# Patient Record
Sex: MALE | HISPANIC OR LATINO | Employment: FULL TIME | URBAN - METROPOLITAN AREA
[De-identification: names, ages, dates, MRNs, and addresses within clinical notes are randomized per-mention and may not be internally consistent; named-entity substitution may affect disease eponyms.]

---

## 2018-04-27 ENCOUNTER — HOSPITAL ENCOUNTER (EMERGENCY)
Facility: CLINIC | Age: 43
Discharge: HOME | End: 2018-04-27
Attending: EMERGENCY MEDICINE | Admitting: EMERGENCY MEDICINE

## 2018-04-27 ENCOUNTER — APPOINTMENT (OUTPATIENT)
Dept: CT IMAGING | Facility: CLINIC | Age: 43
End: 2018-04-27
Attending: EMERGENCY MEDICINE

## 2018-04-27 VITALS
DIASTOLIC BLOOD PRESSURE: 109 MMHG | OXYGEN SATURATION: 98 % | RESPIRATION RATE: 18 BRPM | HEART RATE: 70 BPM | TEMPERATURE: 98.1 F | SYSTOLIC BLOOD PRESSURE: 138 MMHG

## 2018-04-27 DIAGNOSIS — M54.50 ACUTE RIGHT-SIDED LOW BACK PAIN WITHOUT SCIATICA: ICD-10-CM

## 2018-04-27 LAB
ALBUMIN SERPL-MCNC: 4 G/DL (ref 3.4–5)
ALBUMIN UR-MCNC: 10 MG/DL
ALP SERPL-CCNC: 75 U/L (ref 40–150)
ALT SERPL W P-5'-P-CCNC: 40 U/L (ref 0–70)
ANION GAP SERPL CALCULATED.3IONS-SCNC: 9 MMOL/L (ref 3–14)
APPEARANCE UR: CLEAR
AST SERPL W P-5'-P-CCNC: 16 U/L (ref 0–45)
BASOPHILS # BLD AUTO: 0 10E9/L (ref 0–0.2)
BASOPHILS NFR BLD AUTO: 0.2 %
BILIRUB SERPL-MCNC: 0.4 MG/DL (ref 0.2–1.3)
BILIRUB UR QL STRIP: NEGATIVE
BUN SERPL-MCNC: 19 MG/DL (ref 7–30)
CALCIUM SERPL-MCNC: 8.9 MG/DL (ref 8.5–10.1)
CHLORIDE SERPL-SCNC: 104 MMOL/L (ref 94–109)
CO2 SERPL-SCNC: 26 MMOL/L (ref 20–32)
COLOR UR AUTO: YELLOW
CREAT SERPL-MCNC: 0.77 MG/DL (ref 0.66–1.25)
DIFFERENTIAL METHOD BLD: ABNORMAL
EOSINOPHIL # BLD AUTO: 0 10E9/L (ref 0–0.7)
EOSINOPHIL NFR BLD AUTO: 0.2 %
ERYTHROCYTE [DISTWIDTH] IN BLOOD BY AUTOMATED COUNT: 13 % (ref 10–15)
GFR SERPL CREATININE-BSD FRML MDRD: >90 ML/MIN/1.7M2
GLUCOSE SERPL-MCNC: 113 MG/DL (ref 70–99)
GLUCOSE UR STRIP-MCNC: NEGATIVE MG/DL
HCT VFR BLD AUTO: 48.4 % (ref 40–53)
HGB BLD-MCNC: 16.5 G/DL (ref 13.3–17.7)
HGB UR QL STRIP: NEGATIVE
IMM GRANULOCYTES # BLD: 0.4 10E9/L (ref 0–0.4)
IMM GRANULOCYTES NFR BLD: 3.3 %
KETONES UR STRIP-MCNC: NEGATIVE MG/DL
LEUKOCYTE ESTERASE UR QL STRIP: NEGATIVE
LIPASE SERPL-CCNC: 309 U/L (ref 73–393)
LYMPHOCYTES # BLD AUTO: 1.9 10E9/L (ref 0.8–5.3)
LYMPHOCYTES NFR BLD AUTO: 14.3 %
MCH RBC QN AUTO: 30.4 PG (ref 26.5–33)
MCHC RBC AUTO-ENTMCNC: 34.1 G/DL (ref 31.5–36.5)
MCV RBC AUTO: 89 FL (ref 78–100)
MONOCYTES # BLD AUTO: 1 10E9/L (ref 0–1.3)
MONOCYTES NFR BLD AUTO: 7.7 %
MUCOUS THREADS #/AREA URNS LPF: PRESENT /LPF
NEUTROPHILS # BLD AUTO: 9.9 10E9/L (ref 1.6–8.3)
NEUTROPHILS NFR BLD AUTO: 74.3 %
NITRATE UR QL: NEGATIVE
NRBC # BLD AUTO: 0 10*3/UL
NRBC BLD AUTO-RTO: 0 /100
PH UR STRIP: 6.5 PH (ref 5–7)
PLATELET # BLD AUTO: 309 10E9/L (ref 150–450)
POTASSIUM SERPL-SCNC: 4.1 MMOL/L (ref 3.4–5.3)
PROT SERPL-MCNC: 7.7 G/DL (ref 6.8–8.8)
RBC # BLD AUTO: 5.42 10E12/L (ref 4.4–5.9)
RBC #/AREA URNS AUTO: 1 /HPF (ref 0–2)
SODIUM SERPL-SCNC: 139 MMOL/L (ref 133–144)
SOURCE: ABNORMAL
SP GR UR STRIP: 1.03 (ref 1–1.03)
SQUAMOUS #/AREA URNS AUTO: <1 /HPF (ref 0–1)
UROBILINOGEN UR STRIP-MCNC: NORMAL MG/DL (ref 0–2)
WBC # BLD AUTO: 13.3 10E9/L (ref 4–11)
WBC #/AREA URNS AUTO: 1 /HPF (ref 0–5)

## 2018-04-27 PROCEDURE — 74176 CT ABD & PELVIS W/O CONTRAST: CPT

## 2018-04-27 PROCEDURE — 85025 COMPLETE CBC W/AUTO DIFF WBC: CPT | Performed by: EMERGENCY MEDICINE

## 2018-04-27 PROCEDURE — 99285 EMERGENCY DEPT VISIT HI MDM: CPT | Mod: Z6 | Performed by: EMERGENCY MEDICINE

## 2018-04-27 PROCEDURE — 80053 COMPREHEN METABOLIC PANEL: CPT | Performed by: EMERGENCY MEDICINE

## 2018-04-27 PROCEDURE — 96374 THER/PROPH/DIAG INJ IV PUSH: CPT

## 2018-04-27 PROCEDURE — 25000128 H RX IP 250 OP 636: Performed by: EMERGENCY MEDICINE

## 2018-04-27 PROCEDURE — 81001 URINALYSIS AUTO W/SCOPE: CPT | Performed by: EMERGENCY MEDICINE

## 2018-04-27 PROCEDURE — 99285 EMERGENCY DEPT VISIT HI MDM: CPT | Mod: 25

## 2018-04-27 PROCEDURE — 83690 ASSAY OF LIPASE: CPT | Performed by: EMERGENCY MEDICINE

## 2018-04-27 RX ORDER — OXYCODONE AND ACETAMINOPHEN 5; 325 MG/1; MG/1
1-2 TABLET ORAL EVERY 4 HOURS PRN
Qty: 14 TABLET | Refills: 0 | Status: SHIPPED | OUTPATIENT
Start: 2018-04-27

## 2018-04-27 RX ORDER — MORPHINE SULFATE 4 MG/ML
4 INJECTION, SOLUTION INTRAMUSCULAR; INTRAVENOUS ONCE
Status: COMPLETED | OUTPATIENT
Start: 2018-04-27 | End: 2018-04-27

## 2018-04-27 RX ADMIN — MORPHINE SULFATE 4 MG: 4 INJECTION, SOLUTION INTRAMUSCULAR; INTRAVENOUS at 09:04

## 2018-04-27 ASSESSMENT — ENCOUNTER SYMPTOMS
FEVER: 0
WEAKNESS: 0
NUMBNESS: 0
ABDOMINAL PAIN: 0
NAUSEA: 0
DYSURIA: 0
VOMITING: 0
BACK PAIN: 1
SHORTNESS OF BREATH: 0

## 2018-04-27 NOTE — ED AVS SNAPSHOT
[facility], Emergency Department    [address] [address]    Phone:  [phone]    Fax:  [phone]                                       Greg Albert   MRN: [id number]    Department:  [facility], Emergency Department   Date of Visit:  4/27/2018           Patient Information     Date Of Birth          1975        Your diagnoses for this visit were:     Acute right-sided low back pain without sciatica        You were seen by Virginia Avalos MD.        Discharge Instructions         Dolor De Sridevi O De Espalda [Neck/Back Pain, General]    Tanto el dolor de sridevi fazal el de espalda suelen ser consecuencia de santana lesión en los músculos o ligamentos de la columna vertebral. Algunas veces los discos que separan cada hueso de la columna pueden producir dolor al ejercer presión sobre el nervio que está al lado. El dolor de sridevi y de espalda puede aparecer a consecuencia de santana fuerza que produce torsión repentina (fazal en un accidente de automóvil) o de un movimiento inhabitual. En ambos casos, es frecuente que el problema vaya acompañado por espasmos musculares que contribuyen al dolor.  El dolor pierre de sridevi y de espalda suele mejorar en santana o dos semanas. El dolor relacionado con los trastornos de los discos, la artritis en las articulaciones vertebrales o la estenosis (estrechamiento) del canal medular pueden convertirse en problemas crónicos y durar meses o años.  Cuidados En [place]:  1. PARA EL DOLOR DE SRIDEVI: Use santana almohada cómoda que dé soporte a la kristopher y mantenga la columna en santana posición neutral. La kristopher no debe estar inclinada hacia adelante ni hacia atrás.  PARA EL DOLOR DE ESPALDA: Es posible que deba permanecer en la cama los primeros días. Holger tan pronto pueda, debe empezar a sentarse o caminar para evitar los problemas asociados con el descanso prolongado en la cama (debilidad muscular, empeoramiento de la rigidez y el dolor en la espalda, y  coágulos sanguíneos en las piernas).  2. Cuando esté en la cama, trate de encontrar santana posición cómoda. Es aconsejable que use un colchón firme. Intente acostarse boca arriba con almohadas debajo de las rodillas. También puede intentar acostarse de lado con las rodillas dobladas hacia el pecho y santana almohada entre las rodillas.  3. Evite permanecer sentado kimberly períodos largos, ya que esto implica más esfuerzo en la parte inferior de la espalda que cuando está de pié o caminando.  4. Algunas personas encuentran alivio con la aplicación de calor (ducha o baño caliente, o santana compresa caliente) y masajes, mientras que otras prefieren aplicar frío (hielo sadaf o en cubos dentro de santana bolsa envuelta en santana toalla). Pruebe ambos métodos y use el que le dé mejor resultado kimberly 20 minutos varias veces al día.  5. Puede usar acetaminofén (Tylenol) o ibuprofeno (Motrin o Advil) para controlar el dolor, a menos que le hayan recetado otro medicamento. [NOTA: Si tiene santana enfermedad hepática o renal crónica, o ha tenido alguna vez santana úlcera estomacal o sangrado gastrointestinal, consulte con brar médico antes de sarah estos medicamentos.]  6. Aprenda las técnicas adecuadas para levantar objetos y no levante nada que pese más de 15 libras hasta que el dolor haya desaparecido por completo.  Programe santana VISITA DE CONTROL con brar médico o con eden centro si los síntomas no empiezan a mejorar después de santana semana. Es posible que necesite fisioterapia o pruebas adicionales.  [NOTA: Si le hicieron radiografías, éstas serán examinadas por un radiólogo y le informarán de los nuevos hallazgos que puedan afectar la atención médica que necesita.]  Busque Prontamente Atención Médica  si algo de lo siguiente ocurre:    El dolor empeora o se extiende a los brazos o a las piernas    Debilidad, entumecimiento o dolor en catracho o ambos brazos o piernas    Pérdida del control del intestino o de la vejiga    Entumecimiento en la marcial de las  ingles    Dificultad para caminar    Fiebre de 100.4 F (38 C) o más schuyler, o fazal le haya indicado brar proveedor de atención médica  Date Last Reviewed: 11/12/2013 2000-2017 [email or web address]. [address] [place] [id number]. Todos los derechos reservados. Esta información no pretende sustituir la atención médica profesional. Sólo brar médico puede diagnosticar y tratar un problema de isaías.          Consejos Para El Cuidado De La Espalda [Back Care Tips]    Hay ciertas cosas que usted puede hacer para prevenir la recurrencia del dolor de espalda pierre y reducir los síntomas del dolor de espalda crónico:    Mantenga un peso saludable. Si tiene sobrepeso, perder peso le ayudará a aliviar la mayoría de los poonam de espalda.    El ejercicio es santana parte importante de la recuperación de los poonam de espalda. La espalda está soportada por los músculos que se encuentran detrás y spike de la columna vertebral. [place] significa que los músculos de la espalda y del abdomen deben fortalecerse para ondina mejor soporte a la columna vertebral.     Nadar y caminar a paso rápido son buenas formas de ejercicio para mejorar brar nivel de forma física.    Practique técnicas seguras para levantar objetos (nai la información que se presenta más abajo).    Adopte posturas correctas al estar sentado, parado o caminando. Evite permanecer sentado kimberly períodos largos, ya que esto implica más esfuerzo en la parte inferior de la espalda que cuando está de pie o caminando.    Póngase zapatos de buena calidad con suficiente soporte del arco. La alineación de los pies y de los tobillos puede afectar los síntomas de la espalda. Las mujeres deben evitar los zapatos de tacón alto.    Los masajes terapéuticos pueden ayudar a aliviar el dolor de espalda crónico.    Kimberly los primeros dos días después de santana lesión severa o un ataque de dolor crónico de espalda, aplique santana bolsa de hielo sobre la marcial dolorida kimberly 20  minutos cada 2-4 horas. Dennard reducirá la hinchazón y el dolor. El calor (duchas o lisa en [place] o santana almohadilla calefactora) ayudan a reducir los espasmos musculares. Puede comenzar aplicando hielo y luego aplicar calor al cabo de dos días. Algunos pacientes se sienten mejor alternando los tratamientos con hielo y con calor. Utilice el método que le dé mejor resultado.    Puede usar acetaminofén (Tylenol) o ibuprofeno (Motrin o Advil) para controlar el dolor, a menos que le hayan recetado otro medicamento.[NOTA: Si tiene santana enfermedad hepática o renal crónica, o ha tenido alguna vez santana úlcera estomacal o sangrado gastrointestinal, consulte con brar médico antes de sarah estos medicamentos.]     Estiramiento Lumbar  Mily es un ejercicio simple de estiramiento que le ayudará a relajar los espasmos musculares y a mantener la espalda más flexible. Si el ejercicio le empeora el dolor de espalda, deje de hacerlo.    Acuéstese boca arriba con las rodillas flexionadas y ambos pies apoyados en el piso.    Levante lentamente la rodilla izquierda hacia el pecho, con la espalda plana contra el piso. Sostenga la posición kimberly 5 segundos.    Relájese y repita el ejercicio con la rodilla derecha.    Repita el ejercicio 10 veces con cada pierna.  Técnica Para Levantar Objetos De Forma Abdi    No doble la cintura para levantar objetos del suelo. En vez de hacer eso, agáchese doblando las rodillas y las caderas.     Mantenga la espalda recta y la kristopher erguida.     Agarre el objeto cerca del cuerpo, directamente frente a usted.    Enderece las piernas para levantar el objeto.     Para bajar el objeto, siga la misma técnica en orden inverso.    Si tiene que arrastrar un objeto por el suelo, empújelo.  Consejos Para Mantener Santana Victoria Postura  SENTADO  Siéntese en jesusita con respaldos rectos o con soporte para la parte inferior de la espalda. Mantenga las rodillas un poco más elevadas que las caderas. En lo  necesario, use un banquito o taburete bajo para mantener los pies apoyados sobre santana superficie sólida.  Siéntese con la espalda recta mientras maneja. Ajuste el asiento hacia adelante para no tener que inclinarse hacia el volante. Santana almohada pequeña o santana toalla enrollada detrás de la parte inferior de la espalda podría ser útil para conducir en viajes largos.   DE PIE  Cuando tenga que estar parado (de pie) kimberly largos períodos apoye la mayor parte del peso sobre santana pierna, cambiando de pierna cada pocos minutos.   AL DORMIR  La mejor manera de dormir es de lado, con las rodillas dobladas. Apoye la kristopher en santana almohada baja para ondina soporte al jeovanny de forma que la columna vertebral quede en posición neutral. Evite las almohadas demasiado gruesas que doblan el jeovanny hacia un lado. Ponga santana almohada entre las piernas para relajar más la parte inferior de la espalda. Si duerme boca arriba, ponga almohadas debajo de las rodillas para mantener las piernas ligeramente flexionadas. Use un colchón firme. Si brar colchón se hunde en la parte donde duerme, cámbielo o use santana tabla de millan contrachapada de media pulgada de espesor debajo del colchón para darle más soporte.  Programe santana VISITA DE CONTROL con brar médico, o según le indique nuestro personal médico.  [NOTA: Si le hicieron radiografías, tomografías computarizadas o resonancias magnéticas, estas serán examinadas por un radiólogo y le informarán de los nuevos hallazgos que puedan afectar la atención médica que necesita.]  Regrese Sin Demora  o llame al médico si nota alguno de los siguientes síntomas:    El dolor empeora o se extiende a los brazos o a las piernas    Debilidad o entumecimiento en catracho o ambos brazos o piernas.    Pérdida de control del intestino o de la vejiga    Entumecimiento en la marcial de las ingles  Date Last Reviewed: 6/1/2016 2000-2017 [employer]. [address] [place] [address]. Todos los derechos  reservados. Esta información no pretende sustituir la atención médica profesional. Sólo brar médico puede diagnosticar y tratar un problema de isaías.      Please make an appointment to follow up with Your Primary Care Provider in 7-14 days if not improving.      24 Hour Appointment Hotline       To make an appointment at any [facility], call [phone] ([phone]). If you don't have a family doctor or clinic, we will help you find one. [facility] are conveniently located to serve the needs of you and your family.             Review of your medicines      START taking        Dose / Directions Last dose taken    oxyCODONE-acetaminophen 5-325 MG per tablet   Commonly known as:  PERCOCET   Dose:  1-2 tablet   Quantity:  14 tablet        Take 1-2 tablets by mouth every 4 hours as needed for pain   Refills:  0                Information about OPIOIDS     PRESCRIPTION OPIOIDS: WHAT YOU NEED TO KNOW   You have a prescription for an opioid (narcotic) pain medicine. Opioids can cause addiction. If you have a history of chemical dependency of any type, you are at a higher risk of becoming addicted to opioids. Only take this medicine after all other options have been tried. Take it for as short a time and as few doses as possible.     Do not:    Drive. If you drive while taking these medicines, you could be arrested for driving under the influence (DUI).    Operate heavy machinery    Do any other dangerous activities while taking these medicines.     Drink any alcohol while taking these medicines.      Take with any other medicines that contain acetaminophen. Read all labels carefully. Look for the word  acetaminophen  or  Tylenol.  Ask your pharmacist if you have questions or are unsure.    Store your pills in a secure place, locked if possible. We will not replace any lost or stolen medicine. If you don t finish your medicine, please throw away (dispose) as directed by your pharmacist. The [place]  Pollution Control Agency has more information about safe disposal: https://www.pca.[facility].[place].us/living-green/managing-unwanted-medications    All opioids tend to cause constipation. Drink plenty of water and eat foods that have a lot of fiber, such as fruits, vegetables, prune juice, apple juice and high-fiber cereal. Take a laxative (Miralax, milk of magnesia, Colace, Senna) if you don t move your bowels at least every other day.         Prescriptions were sent or printed at these locations (1 Prescription)                   Other Prescriptions                Printed at Department/Unit printer (1 of 1)         oxyCODONE-acetaminophen (PERCOCET) 5-325 MG per tablet                Procedures and tests performed during your visit     CBC with platelets differential    CT Abdomen Pelvis w/o Contrast    Comprehensive metabolic panel    Lipase    UA with Microscopic reflex to Culture      Orders Needing Specimen Collection     None      Pending Results     Date and Time Order Name Status Description    4/27/2018 0858 CT Abdomen Pelvis w/o Contrast Preliminary             Pending Culture Results     No orders found from 4/25/2018 to 4/28/2018.            Pending Results Instructions     If you had any lab results that were not finalized at the time of your Discharge, you can call the ED Lab Result RN at [phone]. You will be contacted by this team for any positive Lab results or changes in treatment. The nurses are available 7 days a week from 10A to 6:30P.  You can leave a message 24 hours per day and they will return your call.        Thank you for choosing [facility]       Thank you for choosing [facility] for your care. Our goal is always to provide you with excellent care. Hearing back from our patients is one way we can continue to improve our services. Please take a few minutes to complete the written survey that you may receive in the mail after you visit with us. Thank you!        MyChart Information     [employer]  "lets you send messages to your doctor, view your test results, renew your prescriptions, schedule appointments and more. To sign up, go to [email or web address] . Click on \"Log in\" on the left side of the screen, which will take you to the Welcome page. Then click on \"Sign up Now\" on the right side of the page.     You will be asked to enter the access code listed below, as well as some personal information. Please follow the directions to create your username and password.     Your access code is: [email or web address]  Expires: 2018 10:59 AM     Your access code will  in 90 days. If you need help or a new code, please call your [facility] [facility] clinic or [phone].        Care EveryWhere ID     This is your Care EveryWhere ID. This could be used by other organizations to access your [facility] medical records  [id number]        Equal Access to Services     JOSH QUINN : Hadclaudia Tian, waoleg booker, qasara tenorio, kelsey cartwright . [facility] [phone].    ATENCIÓN: Si habla español, tiene a brar disposición servicios gratuitos de asistencia lingüística. Llame al [phone].    We comply with applicable federal civil rights laws and [place] laws. We do not discriminate on the basis of race, color, national origin, age, disability, sex, sexual orientation, or gender identity.            After Visit Summary       This is your record. Keep this with you and show to your community pharmacist(s) and doctor(s) at your next visit.                  "

## 2018-04-27 NOTE — ED PROVIDER NOTES
History     Chief Complaint   Patient presents with     Back Pain     x3 days     HPI  Greg Albert is a 43 year old male who sense with right sided low back pain.  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  The pain is nonradiating and is very localized to the very lateral aspect of the right lower back.  He started wearing a back brace yesterday and this is helping with the pain.  Patient states that the pain gets significantly worse when he takes the back brace off.  Patient denies any lower extremity numbness or weakness or difficulty walking.  He denies any loss of control of his bowels or bladder.  Denies any dysuria or hematuria or fever.  He is otherwise healthy with no underlying medical problems.  Patient was seen at [facility] emergency department after the injury 2 days ago and reports that he was given pain medicine.  He does not remove the name of the pain medication states that it only helps a little.  He did not have any imaging or labs done at that time.    I have reviewed the Medications, Allergies, Past Medical and Surgical History, and Social History in the Epic system.    History reviewed. No pertinent past medical history.    History reviewed. No pertinent surgical history.    No family history on file.    Social History   Substance Use Topics     Smoking status: Current Some Day Smoker     Smokeless tobacco: Not on file      Comment: occasional     Alcohol use Yes      Comment: occasional         Review of Systems   Constitutional: Negative for fever.   Respiratory: Negative for shortness of breath.    Cardiovascular: Negative for chest pain.   Gastrointestinal: Negative for abdominal pain, nausea and vomiting.   Genitourinary: Negative for dysuria.   Musculoskeletal: Positive for back pain.   Neurological: Negative for weakness and numbness.   All other systems reviewed and are negative.      Physical Exam   BP: (!)  167/116  Pulse: 70  Heart Rate: 67  Temp: 98.1  F (36.7  C)  Resp: 18  SpO2: 97 %      Physical Exam    GEN:  Alert, well developed, no acute distress, patient is standing during the entire interview  HEENT:  PERRL, EOMI, Mucous membranes are moist.   Cardio:  RRR, no murmur, radial pulses equal bilaterally  PULM:  Lungs clear, good air movement, no wheezes, rales   Abd:  Soft, normal bowel sounds, no focal tenderness  Back exam:  No CVA tenderness, the back brace was removed for the exam when the patient took off the back brace, he clearly had severe pain and was tearful.  He had difficulty moving from a standing position to lying on the cart.  There is tenderness over the right lower and lateral back area without any erythema or crepitance.  No vertebral tenderness.  Musculoskeletal:  normal range of motion of the extremities, no lower extremity swelling or calf tenderness  Neuro:  Alert and oriented X3, Follows commands, moving all extremities spontaneously, normal strength and sensation in both legs and feet with normal gait.  Skin:  Warm, dry   ED Course     ED Course     Procedures             Critical Care time:  none  Labs are normal except as shown.  Patient was given IV morphine for pain and had improvement in his pain.  CT scan of the abdomen pelvis without contrast was done and results are shown here:  Results for orders placed or performed during the hospital encounter of 04/27/18   CT Abdomen Pelvis w/o Contrast    Narrative    CT ABDOMEN AND PELVIS WITHOUT CONTRAST 4/27/2018 9:23 AM    HISTORY: Right-sided back pain. Evaluate for renal calculus.    TECHNIQUE: Helical axial scans from the dome of liver through the  pubic symphysis without contrast. Radiation dose for this scan was  reduced using automated exposure control, adjustment of the mA and/or  kV according to patient size, or iterative reconstruction technique.    COMPARISON: None.    FINDINGS: No urinary tract calculi or obstruction  bilaterally.    The liver, spleen, pancreas, bilateral adrenal glands and remainder of  the kidneys bilaterally appear normal without contrast. The bowel and  mesentery in the upper abdomen is unremarkable.    Scans through the pelvis show no acute abnormality or free fluid. The  appendix is identified and appears normal.      Impression    IMPRESSION: No acute abnormality in the abdomen or pelvis. In  particular, there is no evidence for urinary tract calculi or  obstruction.     Care everywhere records from [facility] were reviewed and revealed patient was prescribed Vicodin and Valium as well as a Medrol Dosepak from the emergency department on Tuesday, March 24.  He also had a follow-up visit with the primary care provider the following day.  Patient states she has been taking the medications as prescribed and continues to have pain.    Labs Ordered and Resulted from Time of ED Arrival Up to the Time of Departure from the ED   CBC WITH PLATELETS DIFFERENTIAL - Abnormal; Notable for the following:        Result Value    WBC 13.3 (*)     Absolute Neutrophil 9.9 (*)     All other components within normal limits   COMPREHENSIVE METABOLIC PANEL - Abnormal; Notable for the following:     Glucose 113 (*)     All other components within normal limits   ROUTINE UA WITH MICROSCOPIC REFLEX TO CULTURE - Abnormal; Notable for the following:     Protein Albumin Urine 10 (*)     Mucous Urine Present (*)     All other components within normal limits   LIPASE            Assessments & Plan (with Medical Decision Making)   Patient presents with right lower back pain and has had a negative workup in the emergency department.  There are no neurologic symptoms and no neuro deficits.  Patient is quite tender on the right lower back area and I believe this is most likely muscular pain.  There is no sign of acute infection.  Urine is not suspicious for kidney infection.  He was advised to avoid lifting more than  10-15 pounds and was advised to follow-up with his primary care provider and consider physical therapy if not feeling better in the next 1-2 weeks.  Since patient is continuing to have pain while taking Vicodin and Valium, he was given a prescription for Percocet.  Patient was advised to return to the emergency department if he has new or worsening symptoms or other concerns.    I have reviewed the nursing notes.    I have reviewed the findings, diagnosis, plan and need for follow up with the patient.    Discharge Medication List as of 4/27/2018 10:59 AM      START taking these medications    Details   oxyCODONE-acetaminophen (PERCOCET) 5-325 MG per tablet Take 1-2 tablets by mouth every 4 hours as needed for pain, Disp-14 tablet, R-0, Local Print             Final diagnoses:   Acute right-sided low back pain without sciatica       4/27/2018   [facility], [place], EMERGENCY DEPARTMENT     Virginia Avalos MD  04/27/18 2363

## 2018-04-27 NOTE — DISCHARGE INSTRUCTIONS
Dolor De Sridevi O De Espalda [Neck/Back Pain, General]    Tanto el dolor de sridevi fazal el de espalda suelen ser consecuencia de santana lesión en los músculos o ligamentos de la columna vertebral. Algunas veces los discos que separan cada hueso de la columna pueden producir dolor al ejercer presión sobre el nervio que está al lado. El dolor de sridevi y de espalda puede aparecer a consecuencia de santana fuerza que produce torsión repentina (fazal en un accidente de automóvil) o de un movimiento inhabitual. En ambos casos, es frecuente que el problema vaya acompañado por espasmos musculares que contribuyen al dolor.  El dolor pierre de sridevi y de espalda suele mejorar en santana o dos semanas. El dolor relacionado con los trastornos de los discos, la artritis en las articulaciones vertebrales o la estenosis (estrechamiento) del canal medular pueden convertirse en problemas crónicos y durar meses o años.  Cuidados En [place]:  1. PARA EL DOLOR DE SRIDEVI: Use santana almohada cómoda que dé soporte a la kristopher y mantenga la columna en santana posición neutral. La kristopher no debe estar inclinada hacia adelante ni hacia atrás.  PARA EL DOLOR DE ESPALDA: Es posible que deba permanecer en la cama los primeros días. Holger tan pronto pueda, debe empezar a sentarse o caminar para evitar los problemas asociados con el descanso prolongado en la cama (debilidad muscular, empeoramiento de la rigidez y el dolor en la espalda, y coágulos sanguíneos en las piernas).  2. Cuando esté en la cama, trate de encontrar santana posición cómoda. Es aconsejable que use un colchón firme. Intente acostarse boca arriba con almohadas debajo de las rodillas. También puede intentar acostarse de lado con las rodillas dobladas hacia el pecho y santana almohada entre las rodillas.  3. Evite permanecer sentado kimberly períodos largos, ya que esto implica más esfuerzo en la parte inferior de la espalda que cuando está de pié o caminando.  4. Algunas personas encuentran alivio con  la aplicación de calor (ducha o baño caliente, o santana compresa caliente) y masajes, mientras que otras prefieren aplicar frío (hielo sadaf o en cubos dentro de santana bolsa envuelta en santana toalla). Pruebe ambos métodos y use el que le dé mejor resultado kimberly 20 minutos varias veces al día.  5. Puede usar acetaminofén (Tylenol) o ibuprofeno (Motrin o Advil) para controlar el dolor, a menos que le hayan recetado otro medicamento. [NOTA: Si tiene santana enfermedad hepática o renal crónica, o ha tenido alguna vez santana úlcera estomacal o sangrado gastrointestinal, consulte con brar médico antes de sarah estos medicamentos.]  6. Aprenda las técnicas adecuadas para levantar objetos y no levante nada que pese más de 15 libras hasta que el dolor haya desaparecido por completo.  Programe santana VISITA DE CONTROL con brar médico o con eden centro si los síntomas no empiezan a mejorar después de santana semana. Es posible que necesite fisioterapia o pruebas adicionales.  [NOTA: Si le hicieron radiografías, éstas serán examinadas por un radiólogo y le informarán de los nuevos hallazgos que puedan afectar la atención médica que necesita.]  Busque Prontamente Atención Médica  si algo de lo siguiente ocurre:    El dolor empeora o se extiende a los brazos o a las piernas    Debilidad, entumecimiento o dolor en catracho o ambos brazos o piernas    Pérdida del control del intestino o de la vejiga    Entumecimiento en la marcial de las ingles    Dificultad para caminar    Fiebre de 100.4 F (38 C) o más schuyler, o fazal le haya indicado brar proveedor de atención médica  Date Last Reviewed: 11/12/2013 2000-2017 [employer]. [address] [place] [address], [place] [address]. Todos los derechos reservados. Esta información no pretende sustituir la atención médica profesional. Sólo brar médico puede diagnosticar y tratar un problema de isaías.          Consejos Para El Cuidado De La Espalda [Back Care Tips]    Hay ciertas cosas que usted puede hacer para  prevenir la recurrencia del dolor de espalda pierre y reducir los síntomas del dolor de espalda crónico:    Mantenga un peso saludable. Si tiene sobrepeso, perder peso le ayudará a aliviar la mayoría de los poonam de espalda.    El ejercicio es santana parte importante de la recuperación de los poonam de espalda. La espalda está soportada por los músculos que se encuentran detrás y spike de la columna vertebral. [place] significa que los músculos de la espalda y del abdomen deben fortalecerse para ondina mejor soporte a la columna vertebral.     Nadar y caminar a paso rápido son buenas formas de ejercicio para mejorar brar nivel de forma física.    Practique técnicas seguras para levantar objetos (nai la información que se presenta más abajo).    Adopte posturas correctas al estar sentado, parado o caminando. Evite permanecer sentado kimberly períodos largos, ya que esto implica más esfuerzo en la parte inferior de la espalda que cuando está de pie o caminando.    Póngase zapatos de buena calidad con suficiente soporte del arco. La alineación de los pies y de los tobillos puede afectar los síntomas de la espalda. Las mujeres deben evitar los zapatos de tacón alto.    Los masajes terapéuticos pueden ayudar a aliviar el dolor de espalda crónico.    Kimberly los primeros dos días después de santana lesión severa o un ataque de dolor crónico de espalda, aplique santana bolsa de hielo sobre la marcial dolorida kimberly 20 minutos cada 2-4 horas. [place] reducirá la hinchazón y el dolor. El calor (duchas o lisa en [place] o santana almohadilla calefactora) ayudan a reducir los espasmos musculares. Puede comenzar aplicando hielo y luego aplicar calor al cabo de dos días. Algunos pacientes se sienten mejor alternando los tratamientos con hielo y con calor. Utilice el método que le dé mejor resultado.    Puede usar acetaminofén (Tylenol) o ibuprofeno (Motrin o Advil) para controlar el dolor, a menos que le hayan recetado otro medicamento.[NOTA: Si  tiene santana enfermedad hepática o renal crónica, o ha tenido alguna vez santana úlcera estomacal o sangrado gastrointestinal, consulte con brar médico antes de sarah estos medicamentos.]     Estiramiento Lumbar  Mily es un ejercicio simple de estiramiento que le ayudará a relajar los espasmos musculares y a mantener la espalda más flexible. Si el ejercicio le empeora el dolor de espalda, deje de hacerlo.    Acuéstese boca arriba con las rodillas flexionadas y ambos pies apoyados en el piso.    Levante lentamente la rodilla izquierda hacia el pecho, con la espalda plana contra el piso. Sostenga la posición kimberly 5 segundos.    Relájese y repita el ejercicio con la rodilla derecha.    Repita el ejercicio 10 veces con cada pierna.  Técnica Para Levantar Objetos De Forma Abdi    No doble la cintura para levantar objetos del suelo. En vez de hacer eso, agáchese doblando las rodillas y las caderas.     Mantenga la espalda recta y la kristopher erguida.     Agarre el objeto cerca del cuerpo, directamente frente a usted.    Enderece las piernas para levantar el objeto.     Para bajar el objeto, siga la misma técnica en orden inverso.    Si tiene que arrastrar un objeto por el suelo, empújelo.  Consejos Para Mantener Santana [place] Postura  SENTADO  Siéntese en jesusita con respaldos rectos o con soporte para la parte inferior de la espalda. Mantenga las rodillas un poco más elevadas que las caderas. En lo necesario, use un banquito o taburete bajo para mantener los pies apoyados sobre santana superficie sólida.  Siéntese con la espalda recta mientras maneja. Ajuste el asiento hacia adelante para no tener que inclinarse hacia el volante. Santana almohada pequeña o santana toalla enrollada detrás de la parte inferior de la espalda podría ser útil para conducir en viajes largos.   DE PIE  Cuando tenga que estar parado (de pie) kimberly largos períodos apoye la mayor parte del peso sobre santana pierna, cambiando de pierna cada pocos minutos.   AL  DORMIR  La mejor manera de dormir es de lado, con las rodillas dobladas. Apoye la kristopher en santana almohada baja para ondina soporte al jeovanny de forma que la columna vertebral quede en posición neutral. Evite las almohadas demasiado gruesas que doblan el jeovanny hacia un lado. Ponga santana almohada entre las piernas para relajar más la parte inferior de la espalda. Si duerme boca arriba, ponga almohadas debajo de las rodillas para mantener las piernas ligeramente flexionadas. Use un colchón firme. Si brar colchón se hunde en la parte donde duerme, cámbielo o use santana tabla de millan contrachapada de media pulgada de espesor debajo del colchón para darle más soporte.  Programe santana VISITA DE CONTROL con brar médico, o según le indique nuestro personal médico.  [NOTA: Si le hicieron radiografías, tomografías computarizadas o resonancias magnéticas, estas serán examinadas por un radiólogo y le informarán de los nuevos hallazgos que puedan afectar la atención médica que necesita.]  Regrese Sin Demora  o llame al médico si nota alguno de los siguientes síntomas:    El dolor empeora o se extiende a los brazos o a las piernas    Debilidad o entumecimiento en catracho o ambos brazos o piernas.    Pérdida de control del intestino o de la vejiga    Entumecimiento en la marcial de las ingles  Date Last Reviewed: 6/1/2016 2000-2017 [employer]. [address] [place] [address], [place] [address]. Todos los derechos reservados. Esta información no pretende sustituir la atención médica profesional. Sólo brar médico puede diagnosticar y tratar un problema de isaías.      Please make an appointment to follow up with Your Primary Care Provider in 7-14 days if not improving.

## 2018-04-27 NOTE — ED AVS SNAPSHOT
[facility], [place], Emergency Department    [address]    [place] [address]    Phone:  [phone]    Fax:  [phone]                                       Greg Albert   MRN: [id number]    Department:  [facility], Emergency Department   Date of Visit:  4/27/2018           After Visit Summary Signature Page     I have received my discharge instructions, and my questions have been answered. I have discussed any challenges I see with this plan with the nurse or doctor.    ..........................................................................................................................................  Patient/Patient Representative Signature      ..........................................................................................................................................  Patient Representative Print Name and Relationship to Patient    ..................................................               ................................................  Date                                            Time    ..........................................................................................................................................  Reviewed by Signature/Title    ...................................................              ..............................................  Date                                                            Time

## 2018-04-27 NOTE — ED TRIAGE NOTES
Came in today with family d/t back pain that has been going on for 3 months but has increase about 3 days ago d/t moving heavy object for job

## 2022-04-27 ENCOUNTER — HOSPITAL ENCOUNTER (EMERGENCY)
Facility: CLINIC | Age: 47
Discharge: HOME | End: 2022-04-27
Attending: EMERGENCY MEDICINE | Admitting: EMERGENCY MEDICINE

## 2022-04-27 VITALS
HEIGHT: 72 IN | RESPIRATION RATE: 16 BRPM | WEIGHT: 210 LBS | HEART RATE: 71 BPM | DIASTOLIC BLOOD PRESSURE: 84 MMHG | TEMPERATURE: 98 F | OXYGEN SATURATION: 97 % | SYSTOLIC BLOOD PRESSURE: 131 MMHG | BODY MASS INDEX: 28.44 KG/M2

## 2022-04-27 DIAGNOSIS — L50.9 URTICARIA: ICD-10-CM

## 2022-04-27 PROCEDURE — 99283 EMERGENCY DEPT VISIT LOW MDM: CPT | Performed by: EMERGENCY MEDICINE

## 2022-04-27 PROCEDURE — 99284 EMERGENCY DEPT VISIT MOD MDM: CPT | Performed by: EMERGENCY MEDICINE

## 2022-04-27 RX ORDER — PREDNISONE 20 MG/1
TABLET ORAL
Qty: 10 TABLET | Refills: 0 | Status: SHIPPED | OUTPATIENT
Start: 2022-04-27

## 2022-04-27 RX ORDER — PREDNISONE 20 MG/1
TABLET ORAL
Qty: 10 TABLET | Refills: 0 | Status: SHIPPED | OUTPATIENT
Start: 2022-04-27 | End: 2022-04-27

## 2022-04-27 RX ORDER — DIPHENHYDRAMINE HCL 25 MG
25 CAPSULE ORAL
COMMUNITY
Start: 2022-04-15

## 2022-04-27 RX ORDER — FAMOTIDINE 40 MG/1
40 TABLET, FILM COATED ORAL 2 TIMES DAILY PRN
Qty: 14 TABLET | Refills: 0 | Status: SHIPPED | OUTPATIENT
Start: 2022-04-27 | End: 2022-05-04

## 2022-04-27 RX ORDER — BENZOCAINE/MENTHOL 6 MG-10 MG
LOZENGE MUCOUS MEMBRANE
COMMUNITY
Start: 2022-01-28

## 2022-04-27 ASSESSMENT — ENCOUNTER SYMPTOMS
SHORTNESS OF BREATH: 0
DIFFICULTY URINATING: 0
FEVER: 0
ABDOMINAL PAIN: 0
DIARRHEA: 0
NAUSEA: 0
EYE REDNESS: 0
HEADACHES: 0
CONSTIPATION: 0
COUGH: 0
BACK PAIN: 0

## 2022-04-27 NOTE — ED NOTES
Pt states that this has been ongoing for over a month. He has pictures of himself with a rash after a shower. He has been taking benadryl and a cream for the rash but states that it is not helping.

## 2022-04-27 NOTE — ED TRIAGE NOTES
"Patient presents to the ED from home with complaint of rash present for one month. Accompanied pruritis. Has been seen twice for this once in primary care and once at [facility]. Tried benadryl and another \"cream\" which helped a little but rash persists. No fever, shortness of breath, or known exposure.       Triage Assessment     Row Name 04/27/22 0949       Triage Assessment (Adult)    Airway WDL WDL       Respiratory WDL    Respiratory WDL WDL       Skin Circulation/Temperature WDL    Skin Circulation/Temperature WDL X  rash       Cardiac WDL    Cardiac WDL WDL       Peripheral/Neurovascular WDL    Peripheral Neurovascular WDL WDL       Cognitive/Neuro/Behavioral WDL    Cognitive/Neuro/Behavioral WDL WDL              "

## 2022-04-27 NOTE — ED PROVIDER NOTES
ED Provider Note  [facility]      History     Chief Complaint   Patient presents with     Rash     Present for one month, pruritis      HPI  Greg Albert is a 47 year old male who presents emergency department for evaluation of bilateral upper extremity pruritic rash.  Patient reports that for the past approximately 2 months he has had pruritic rash that was diagnosed as urticaria that comes and goes often involving his upper torso.  He has seen urgent care as well as his regular doctor, currently takes Benadryl regularly and no other medication.  He has discussed at length with most physicians possible environmental exposures but no clear etiology has emerged.  He reports that he is having significant itching and previously has had some difficulty breathing but denies any difficulty breathing currently although he has a rash in bilateral forearms.  Typically he reports that the rash comes on and lasts for approximately 1 to 2 hours and then resolves.    Past Medical History  History reviewed. No pertinent past medical history.  History reviewed. No pertinent surgical history.  diphenhydrAMINE (BENADRYL) 25 MG capsule  famotidine (PEPCID) 40 MG tablet  hydrocortisone (CORTAID) 1 % external cream  oxyCODONE-acetaminophen (PERCOCET) 5-325 MG per tablet  predniSONE (DELTASONE) 20 MG tablet      No Known Allergies  Family History  History reviewed. No pertinent family history.  Social History   Social History     Tobacco Use     Smoking status: Current Some Day Smoker     Smokeless tobacco: Never Used     Tobacco comment: occasional   Substance Use Topics     Alcohol use: Yes     Comment: occasional     Drug use: No      Past medical history, past surgical history, medications, allergies, family history, and social history were reviewed with the patient. No additional pertinent items.       Review of Systems   Constitutional: Negative for fever.   HENT: Negative for congestion.    Eyes:  Negative for redness.   Respiratory: Negative for cough and shortness of breath.    Cardiovascular: Negative for chest pain.   Gastrointestinal: Negative for abdominal pain, constipation, diarrhea and nausea.   Genitourinary: Negative for difficulty urinating.   Musculoskeletal: Negative for back pain.   Skin: Positive for rash.   Neurological: Negative for headaches.   Psychiatric/Behavioral: Negative for suicidal ideas.     A complete review of systems was performed with pertinent positives and negatives noted in the HPI, and all other systems negative.    Physical Exam   BP: (!) 131/91  Pulse: 78  Temp: 98  F (36.7  C)  Resp: 18  Height: 182.9 cm (6')  Weight: 95.3 kg (210 lb)  SpO2: 96 %  Physical Exam  Constitutional:       General: He is awake. He is not in acute distress.     Appearance: Normal appearance. He is well-developed. He is not ill-appearing or toxic-appearing.   HENT:      Head: Atraumatic.   Eyes:      General: No scleral icterus.     Pupils: Pupils are equal, round, and reactive to light.   Cardiovascular:      Rate and Rhythm: Normal rate and regular rhythm.      Heart sounds: Normal heart sounds, S1 normal and S2 normal.   Pulmonary:      Effort: No respiratory distress.      Breath sounds: Normal breath sounds.   Abdominal:      General: Bowel sounds are normal.      Palpations: Abdomen is soft.      Tenderness: There is no abdominal tenderness.   Musculoskeletal:         General: No tenderness.   Skin:     General: Skin is warm.      Findings: Rash present.      Comments: Dorsal forearms bilaterally with diffuse urticarial appearing rash with some linear excoriation that is very superficial with no evidence of superinfection.   Neurological:      Mental Status: He is alert and oriented to person, place, and time.   Psychiatric:         Mood and Affect: Mood normal.         Speech: Speech normal.         Behavior: Behavior is cooperative.         ED Course      Procedures                      No results found for any visits on 04/27/22.  Medications - No data to display     Assessments & Plan (with Medical Decision Making)   Greg Albert is a 47 year old male who presents emergency department for evaluation of bilateral upper extremity pruritic rash.  Rash is most consistent with allergic urticaria, no other systemic symptoms concerning for anaphylaxis.  No indication for emergent labs or imaging at this time.  Patient already taken Benadryl which is good, may benefit from additional histamine blocker.  Will prescribe Pepcid as well as a short course of prednisone.  Will refer to allergy as well.    I have reviewed the nursing notes. I have reviewed the findings, diagnosis, plan and need for follow up with the patient.      Return precautions given, okay to discharge.    New Prescriptions    FAMOTIDINE (PEPCID) 40 MG TABLET    Take 1 tablet (40 mg) by mouth 2 times daily as needed (itching)    PREDNISONE (DELTASONE) 20 MG TABLET    Take two tablets (= 40mg) each day for 5 (five) days       Final diagnoses:   Urticaria       --  Den Mcelroy MD PhD  [facility] EMERGENCY DEPARTMENT  4/27/2022     Den Mcelroy MD  04/27/22 1112

## 2022-06-18 NOTE — TELEPHONE ENCOUNTER
FUTURE VISIT INFORMATION      FUTURE VISIT INFORMATION:    Date: 8.31.22    Time: 7:00    Location: [facility]  REFERRAL INFORMATION:    Referring provider:  Navi    Referring providers clinic:  [facility]    Reason for visit/diagnosis  Urticaria [L50.9]/ referred by Dr Den Mcelroy/ recs in Epic/ appt sched per pt/ ok to sched per Crystal FC    RECORDS REQUESTED FROM:       Clinic name Comments Records Status   [facility] 4.27.22  Navi Epic   [facility] 4.15.22  Sergio    1.28.22  Kristin ZHONG   [facility] 1.31.22  Miah    1.29.22  Sergio CE

## 2022-07-06 ENCOUNTER — TELEPHONE (OUTPATIENT)
Dept: DERMATOLOGY | Facility: CLINIC | Age: 47
End: 2022-07-06

## 2022-07-06 NOTE — TELEPHONE ENCOUNTER
Patient did not want to reschedule his 8/31/22 Allergy appointment. He stated he no longer needed it

## 2022-08-31 ENCOUNTER — PRE VISIT (OUTPATIENT)
Dept: ALLERGY | Facility: CLINIC | Age: 47
End: 2022-08-31